# Patient Record
Sex: FEMALE | Race: WHITE | NOT HISPANIC OR LATINO | Employment: OTHER | URBAN - METROPOLITAN AREA
[De-identification: names, ages, dates, MRNs, and addresses within clinical notes are randomized per-mention and may not be internally consistent; named-entity substitution may affect disease eponyms.]

---

## 2017-01-18 ENCOUNTER — TELEPHONE (OUTPATIENT)
Dept: NEUROLOGY | Facility: CLINIC | Age: 67
End: 2017-01-18

## 2017-01-18 NOTE — TELEPHONE ENCOUNTER
----- Message from Meme Carrillo sent at 1/18/2017  4:05 PM CST -----  Contact: Brandee from Dr. Kirby Lou's office  Dr. Lou would like to refer his patient to be seen by Dr. Beebe in the Neurology department. The patient's diagnosis is parkinson's disease. I have scanned the patients referral and records into media manager. If there are any further questions regarding the patients, please contact Dr. Lou's office at  [phone].

## 2017-02-07 ENCOUNTER — TELEPHONE (OUTPATIENT)
Dept: NEUROLOGY | Facility: CLINIC | Age: 67
End: 2017-02-07

## 2017-02-07 NOTE — TELEPHONE ENCOUNTER
----- Message from Joi Adams sent at 2/7/2017  1:40 PM CST -----  Contact: pt daughter Evangelina  Pt is requesting an earlier appt due to Dr Boyd is referring her to see the doctor for her movement disorders, pt daughter would like an appt as soon as possible then 3/31/17 at 4 pm. Pt daughter would like for the nurse to call her back in regards to an sooner appt.      Pt daughter Evangelina can be reached at [phone].

## 2017-02-07 NOTE — TELEPHONE ENCOUNTER
Spoke w/ pt's daughter Evangelina and confirmed new appt day/time for 3/6/17 @ 2:40pm with Dr. Beebe. Appt letter and directions to be emailed to [email or web address]

## 2017-03-06 ENCOUNTER — OFFICE VISIT (OUTPATIENT)
Dept: NEUROLOGY | Facility: CLINIC | Age: 67
End: 2017-03-06
Payer: MEDICARE

## 2017-03-06 VITALS
SYSTOLIC BLOOD PRESSURE: 111 MMHG | HEART RATE: 81 BPM | WEIGHT: 111.13 LBS | HEIGHT: 65 IN | BODY MASS INDEX: 18.52 KG/M2 | DIASTOLIC BLOOD PRESSURE: 64 MMHG

## 2017-03-06 DIAGNOSIS — M75.02 ADHESIVE CAPSULITIS OF LEFT SHOULDER: Chronic | ICD-10-CM

## 2017-03-06 DIAGNOSIS — R26.81 UNSTEADY GAIT: ICD-10-CM

## 2017-03-06 DIAGNOSIS — G23.2 MULTIPLE SYSTEM ATROPHY P: Primary | Chronic | ICD-10-CM

## 2017-03-06 PROBLEM — N39.0 URINARY TRACT INFECTION: Status: ACTIVE | Noted: 2017-01-11

## 2017-03-06 PROBLEM — G90.3 SHY-DRAGER SYNDROME: Status: ACTIVE | Noted: 2017-03-06

## 2017-03-06 PROBLEM — F32.A DEPRESSION: Status: ACTIVE | Noted: 2017-01-18

## 2017-03-06 PROBLEM — M75.00 FROZEN SHOULDER: Chronic | Status: ACTIVE | Noted: 2017-03-06

## 2017-03-06 PROCEDURE — 99999 PR PBB SHADOW E&M-EST. PATIENT-LVL IV: CPT | Mod: PBBFAC,,, | Performed by: PSYCHIATRY & NEUROLOGY

## 2017-03-06 PROCEDURE — 99205 OFFICE O/P NEW HI 60 MIN: CPT | Mod: S$PBB,,, | Performed by: PSYCHIATRY & NEUROLOGY

## 2017-03-06 PROCEDURE — 99214 OFFICE O/P EST MOD 30 MIN: CPT | Mod: PBBFAC | Performed by: PSYCHIATRY & NEUROLOGY

## 2017-03-06 RX ORDER — CALCIUM CARBONATE 500(1250)
1 TABLET ORAL
COMMUNITY
End: 2018-08-17

## 2017-03-06 RX ORDER — NAPROXEN SODIUM 220 MG/1
81 TABLET, FILM COATED ORAL
COMMUNITY

## 2017-03-06 RX ORDER — METOPROLOL SUCCINATE 25 MG/1
25 TABLET, EXTENDED RELEASE ORAL
COMMUNITY

## 2017-03-06 RX ORDER — SERTRALINE HYDROCHLORIDE 50 MG/1
50 TABLET, FILM COATED ORAL
COMMUNITY
End: 2017-10-04

## 2017-03-06 RX ORDER — FAMOTIDINE 10 MG/1
10 TABLET ORAL DAILY
COMMUNITY

## 2017-03-06 RX ORDER — FLUDROCORTISONE ACETATE 0.1 MG/1
0.1 TABLET ORAL
COMMUNITY

## 2017-03-06 RX ORDER — CARBIDOPA AND LEVODOPA 25; 100 MG/1; MG/1
2 TABLET ORAL 3 TIMES DAILY
COMMUNITY

## 2017-03-06 RX ORDER — DOCUSATE SODIUM 100 MG/1
100 CAPSULE, LIQUID FILLED ORAL
COMMUNITY

## 2017-03-06 RX ORDER — ACETAMINOPHEN 500 MG
500 TABLET ORAL
COMMUNITY

## 2017-03-06 RX ORDER — PROCHLORPERAZINE MALEATE 10 MG
10 TABLET ORAL
COMMUNITY
End: 2017-03-06 | Stop reason: ALTCHOICE

## 2017-03-06 NOTE — MR AVS SNAPSHOT
[facility]  [address]  [place] [address]  Phone: [phone]  Fax: [phone]                  Lizzeth Mason   3/6/2017 2:40 PM   Office Visit    Description:  Female : 1950   Provider:  Ramy Beebe MD   Department:  [facility]           Diagnoses this Visit        Comments    Multiple system atrophy P    -  Primary     Unsteady gait         Adhesive capsulitis of left shoulder                To Do List           Goals (5 Years of Data)     None      [facility]     [facility] On Call Nurse Care Line -  Assistance  Registered nurses in the [facility] On Call Center provide clinical advisement, health education, appointment booking, and other advisory services.  Call for this free service at [phone].             Medications           Message regarding Medications     Verify the changes and/or additions to your medication regime listed below are the same as discussed with your clinician today.  If any of these changes or additions are incorrect, please notify your healthcare provider.        STOP taking these medications     prochlorperazine (COMPAZINE) 10 MG tablet Take 10 mg by mouth.           Verify that the below list of medications is an accurate representation of the medications you are currently taking.  If none reported, the list may be blank. If incorrect, please contact your healthcare provider. Carry this list with you in case of emergency.           Current Medications     acetaminophen (TYLENOL) 500 MG tablet Take 500 mg by mouth.    aspirin 81 MG Chew Take 81 mg by mouth.    calcium carbonate (OS-MARKIE) 500 mg calcium (1,250 mg) tablet Take 1 tablet by mouth.    carbidopa-levodopa  mg (SINEMET)  mg per tablet Take 1 tablet by mouth.    docusate sodium (COLACE) 100 MG capsule Take 100 mg by mouth.    famotidine (PEPCID) 10 MG tablet Take 10 mg by mouth.    fludrocortisone (FLORINEF) 0.1 mg Tab Take 0.1 mg by mouth.    ibuprofen  "(ADVIL,MOTRIN) 100 MG tablet Take 100 mg by mouth.    metoprolol succinate (TOPROL-XL) 25 MG 24 hr tablet Take 25 mg by mouth.    sertraline (ZOLOFT) 50 MG tablet Take 50 mg by mouth.           Clinical Reference Information           Your Vitals Were     BP Pulse Height Weight BMI    111/64 81 5' 5" (1.651 m) 50.4 kg (111 lb 1.8 oz) 18.49 kg/m2      Blood Pressure          Most Recent Value    BP  111/64      Allergies as of 3/6/2017     Chlorzoxazone    Floxin [Ofloxacin]    Morphine    Pamelor [Nortriptyline]    Compazine [Prochlorperazine Edisylate]    Phenergan [Promethazine]      Immunizations Administered on Date of Encounter - 3/6/2017     None      Orders Placed During Today's Visit      Normal Orders This Visit    Ambulatory consult to Occupational Therapy     Ambulatory consult to Orthopedics     Ambulatory consult to Physical Therapy     Ambulatory Referral to Speech Therapy       Instructions    Try taking carbidopa/levodopa 25/100mg  Three tablets three times per day if this causes significant side effects call Dr. Beebe and go back to taking it 2 tabs three times daily    Try a mattress bar at home for mobility     See an orthopedist for your left shoulder     Take your PT/OT/ and speech therapy referrals to a local therapy system. Contact Dr. Reed if you need to do home therapy again.          Language Assistance Services     ATTENTION: Language assistance services are available, free of charge. Please call [phone].      ATENCIÓN: Si habla espkristine, tiene a covington disposición servicios gratuitos de asistencia lingüística. Llame al [phone].     [facility] Ý: N?u b?n nói Ti?ng Vi?t, có các d?ch v? h? tr? ngôn ng? mi?n phí dành cho b?n. G?i s? [phone].         [facility] complies with applicable Federal civil rights laws and does not discriminate on the basis of race, color, national origin, age, disability, or sex.        "

## 2017-03-06 NOTE — LETTER
March 14, 2017      Kirby Lou MD  [address] [facility] [place] [address]           [facility]  [address]  [place] [address]  Phone: [phone]  Fax: [phone]          Patient: Lizzeth Mason   MR Number: [id number]   YOB: 1950   Date of Visit: 3/6/2017       Dear Dr. Kirby Lou:    Thank you for referring Lizzeth Mason to me for evaluation. Attached you will find relevant portions of my assessment and plan of care.    If you have questions, please do not hesitate to call me. I look forward to following Lizzeth Mason along with you.    Sincerely,    Ramy Beebe MD    Enclosure  CC:  No Recipients    If you would like to receive this communication electronically, please contact [email or web address] or [phone] to request more information on [employer] access.    For providers and/or their staff who would like to refer a patient to [facility], please contact us through our one-stop-shop provider referral line, [facility], at [phone].    If you feel you have received this communication in error or would no longer like to receive these types of communications, please e-mail [email or web address]

## 2017-03-06 NOTE — PROGRESS NOTES
Name: Lizzeth Mason  MRN: [id number]   CSN: [id number]      Date: 03/06/2017    Referring physician:  Kirby Lou MD  [address] [facility] [place] [address]    Chief Complaint / Interval History: No chief complaint on file.      History of Present Illness (HPI):  67 yo with referral for PDism. Retired ER nurse. Accompanied by her daughter and Sister     2 1/2 yeas ago, developed speech issue.  Sounded high pitched and strained.  Speech problem is progressively getting worse. First assessed in July of 2015.     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 Blood pressure issues started also in the last 2- 2 1/2 years. Pressures low as 70s over 40s. She has been on florinef for about two years.  This has been helping but only recently.     Had to have suprapubic catheter placed 1/12/17 for severe urinary retention with overflow incontinence with recuurent E. Coli UTIs    She used to suffer from significant diarrhea but now she has severe constipation.     She also has trouble chewing and swallowing     Previously independent. Now lives with her daughter     Has had multiple falls     Also suffers from Raynauds type phenomenon     Has lost function of her left side more so than the right, dropping things and draging her foot on the left     Leaning over to her left side while seated     Drools frequently, has had Botox in the past     Nonmotor/Premotor ROS:  Hyposmia (HENT)?No  RBD/sleep issues (Constitutional)?Yes, talking in her sleep, scary sounding breathing, with jeking   Depression/anxiety (Psychiatric)?More depression than anxiety   Fatigue (Constitutional)?Yes  Constipation (GI)?Yes  Urinary issues ()?Yes  Sexual dysfunction ()?N/A  Orthostasis (Cardiovascular)?Yes  Leg swelling (Cardiovascular)? Yes  Falls (Musculoskeletal)?Yes  Cognitive impairment (Neurologic)?No  Psychoses (Psychiatric)?No  Pain/Paresthesia  "(Neurologic)?Yes - feet burn and tingle   Visual changes (Eyes)?No  Moles / skin changes (Skin)?No  Stridor / SOB (Pulm)?Occasional SOB  Bruising (Heme)?No     Has also had hair loss and weight loss     Past Medical History: The patient  has no past medical history on file.    Social History: The patient  reports that she has never smoked. She does not have any smokeless tobacco history on file.    Family History: Their family history is not on file.    Allergies: Chlorzoxazone; Floxin [ofloxacin]; Morphine; Pamelor [nortriptyline]; and Ondansetron     Meds:   No current outpatient prescriptions on file prior to visit.     No current facility-administered medications on file prior to visit.        Current Neuro Meds:  Florinef 0.3mg QAM  Sinemet 25/100mg 2 tablets TID. When she is off of it, has trouble moving and has internal shaking sensation with irregular heart beat       Exam:  /64  Pulse 81  Ht 5' 5" (1.651 m)  Wt 50.4 kg (111 lb 1.8 oz)  BMI 18.49 kg/m2    Constitutional  Well-developed, well-nourished, appears stated age   Cardiovascular  Radial pulses 2+ and symmetric, + LLE edema    Neurological    * Mental status       - Orientation  Oriented to person, place, time, and situation     - Memory   Not formally tested      - Attention/concentration  Attentive, vigilant during exam     - Language  Naming & repetition intact     - Fund of knowledge  Aware of current events     - Executive  Well-organized thoughts     - Other     * Cranial nerves       - CN II  visual fields full to confrontation     - CN III, IV, VI  Extraocular movements full, normal pursuits and saccades     - CN V  Sensation V1 - V3 intact     - CN VII  Face strong and symmetric bilaterally     - CN VIII  Hearing intact bilaterally     - CN IX, X  Palate raises midline and symmetric     - CN XI  SCM and trapezius 4+/5 bilaterally     - CN XII  Tongue midline   * Motor  Muscle bulk normal, strength 4+/5 throughout Triceps, Biceps " 4/5  Can only abduct the arm to just under 90degrees   * Sensory   Intact to temperature and vibration throughout   * Coordination  No dysmetria with finger-to-nose or heel-to-shin   * Gait  See below.   * Deep tendon reflexes  3+ and symmetric throughout  + glabellar and jaw jerk  + striatal left toe      * Specialized movement exam  + hypophonic and dysphonic speech.    Slight facial masking.   No cogwheel rigidity.    Moderate to severe bradykinesia L >R.   No tremor with rest, posture, kinesis, or intention.    No other dystonia, chorea, athetosis, myoclonus, or tics.   No motor impersistence.   Slow to stand and requires both hands to do so    Stooped posture with shortend stride      Laboratory/Radiological:  - Results:No results found for any previous visit.    - Independent review of images:    MRI Brain without contrast and MRI C spine without contrast reviewed on CD in office - mild temporal atrophy with no pathognomonic signs of Parkinsonian syndromes with no severe spinal canal stenosis     WAI scan: 8/4/15   Based upon visual interpretation and quantitative analysis, abnormal  brain DaTscan.  Nearly complete lack of tracer uptake bilaterally (as  described above, type 3 deficit).  As such, images are consistent with  underlying present presynaptic Parkinsonian syndrome.  Clinical  correlation is recommended.      ______________________________  David Aldridge MD    PET Scan 11/18/2016:   FINDINGS:  GLOBAL STRUCTURE:  Mild to moderate global cerebral atrophy with a  moderate widening of the interthalamic distance as well as a mild  widening of the interhemispheric fissure.    CORTICAL METABOLISM:  Mild/subtle potential for hypometabolism within  the anterolateral portion of the temporal lobes bilaterally.   Posterior temporoparietal portions with relatively preserved uptake.   Similarly preserved uptake within the higher parietal cortex including  the posterior cingulate.  Mild/subtle hypometabolic  change in the  anterior cingulate extending subtly to the mesial/superior frontal  region.  Occipital pole metabolically intact.    SUBCORTICAL METABOLISM:  Hypometabolism within the right striatum  affecting the right putamen more so than the right caudate.  The left  striatum shows relatively preserved metabolic uptake within both  caudate and putaminal regions.  Thalamic uptake is symmetric as are  the cerebellar hemispheres and brain stem.    NEURO-Q:  Quantitative analysis demonstrates relatively mild change in  the temporal regions bilaterally.  Parietal regions do not reach  statistical significance with only subtle change in the inferior  parietal cortex and relatively preserved posterior cingulate.  Frontal  regions are similarly intact from a metabolic standpoint. Otherwise,  no statistical changes with relative preservation of the occipital  pole, visual association cortices, cerebellum and brain stem.   Right putamen is statistically hypometabolic.     IMPRESSION:  Based upon visual interpretation and quantitative analysis, relatively  normal FDG PET.  Subtle change within the bilateral temporal regions  relatively isolated to the anterolateral portions does not extend  posteriorly nor does it extend significantly up into the parietal lobe  or posterior cingulate.  Frontal structures show relatively symmetric  and overall generally preserved metabolic uptake.  All of the above  subtle changes potentially associated with normal variation.  No clear  pattern to suggest frontotemporal degeneration, nor distinct Alzheimer's   pattern.   Of note, although typically bilateral, hypometabolism of putamen regions   has   been associated with atypical Parkinsonian syndromes/Multi-system atrophy.  Clinical correlation recommended in the context of the above.    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 Lou and with Dr Beebe every 6 months to one year as desired by patient and family     Harry Bautista MD  Movement Disorders Fellow  [facility]     I spent 90 minutes face-to-face with the patient with >50% of the time spent with counseling and education regarding:  - results of data, diagnosis, and recommendations stated above  - the prognosis of a MSA   - risks and benefits of changing CD/LD dosing  - importance of diet and exercise      ==================  Patient seen and examined.  I agree with the history, exam, assessment and plan within the fellow's note as stated above.  Note has been edited by me to reflect my work and changes.    Ramy Beebe MD, MPH  Division of Movement and Memory Disorders  [facility]

## 2017-03-07 NOTE — PATIENT INSTRUCTIONS
Try taking carbidopa/levodopa 25/100mg  Three tablets three times per day if this causes significant side effects call Dr. Beebe and go back to taking it 2 tabs three times daily    Try a mattress bar at home for mobility     See an orthopedist for your left shoulder     Take your PT/OT/ and speech therapy referrals to a local therapy system. Contact Dr. Reed if you need to do home therapy again.

## 2017-05-12 ENCOUNTER — TELEPHONE (OUTPATIENT)
Dept: NEUROLOGY | Facility: CLINIC | Age: 67
End: 2017-05-12

## 2017-05-12 NOTE — TELEPHONE ENCOUNTER
----- Message from Renu Adhikari sent at 5/10/2017  4:37 PM CDT -----  Contact: Dr Parker Lou  Hi,  Dr Lou was looking for someone here who does Botox in salivary gland for a pt that has Parkinsons. Dr Chavez said she could do this but I also heard Dr Hilliard does as well. A little confused about where to send pt. Can both staff look over records that are in  and determine where to send pt. Please reply.  Thanks!  Renu

## 2017-05-17 ENCOUNTER — TELEPHONE (OUTPATIENT)
Dept: NEUROLOGY | Facility: CLINIC | Age: 67
End: 2017-05-17

## 2017-05-17 NOTE — TELEPHONE ENCOUNTER
----- Message from Renu Adhikari sent at 5/16/2017  3:50 PM CDT -----  Contact: Renu   Would Dr Chavez prefer to see pt?  ----- Message -----     From: Nallely Jaramillo, RN     Sent: 5/16/2017   3:36 PM       To: Renu Garrett agreed to see patient. However, he wasn't sure b/c patient is known to Neurology if they would prefer that route. Either is fine with Dr. Garrett. Let me know and I will schedule patient.  Thanks,  [place]  [id number]    ----- Message -----     From: Renu Adhikari     Sent: 5/16/2017   3:30 PM       To: Gerry CARBALLO Staff, Kathy HOGAN Staff    Has either Dr approved to see this pt for Botox in salivary gland ?

## 2017-05-23 NOTE — TELEPHONE ENCOUNTER
I just realized this is Ramy's patient.  He just saw her a couple months ago.  We can cut Garrett out of the loop- either DH or I can do it.

## 2017-05-24 DIAGNOSIS — K11.7 SIALORRHEA: Primary | ICD-10-CM

## 2017-06-08 ENCOUNTER — TELEPHONE (OUTPATIENT)
Dept: NEUROLOGY | Facility: CLINIC | Age: 67
End: 2017-06-08

## 2017-06-08 NOTE — TELEPHONE ENCOUNTER
Patient scheduled 6/16/17 at 8:40AM per Dr. Beebe for Botox injections. Call placed to patient to inform, left detailed message with appointment date and time as well as office call back.

## 2017-06-08 NOTE — TELEPHONE ENCOUNTER
----- Message from Kathy Villalobos sent at 6/8/2017  4:33 PM CDT -----  Contact: Pt. daughter Maegan Crowder  Good afternoon,     Pt's daughter would like a call back regarding rescheduling appt on 06/16/17 for a later time.    Pt's daughter can be reached at [phone]    Thank you!

## 2017-06-09 NOTE — TELEPHONE ENCOUNTER
Called pt daughter and left a voicemail updating her on the scheduled date and time for Lizzeth Mason on 6/16 at 8:40 a.m

## 2017-06-09 NOTE — TELEPHONE ENCOUNTER
----- Message from Carmela Bah sent at 6/9/2017  2:08 PM CDT -----  Contact: daughter Maegan Crowder called Lizzeth's phone number [phone]  Maegan states that she was told by her mom to return Kimberly's call to r/s her appt to a later time on 6/16/17. Please call

## 2017-06-16 ENCOUNTER — PROCEDURE VISIT (OUTPATIENT)
Dept: NEUROLOGY | Facility: CLINIC | Age: 67
End: 2017-06-16
Payer: MEDICARE

## 2017-06-16 VITALS
HEART RATE: 79 BPM | HEIGHT: 65 IN | DIASTOLIC BLOOD PRESSURE: 62 MMHG | BODY MASS INDEX: 19.54 KG/M2 | SYSTOLIC BLOOD PRESSURE: 101 MMHG | WEIGHT: 117.31 LBS

## 2017-06-16 DIAGNOSIS — K11.7 SIALORRHEA: Primary | ICD-10-CM

## 2017-06-16 PROCEDURE — 64611 CHEMODENERV SALIV GLANDS: CPT | Mod: S$PBB,,, | Performed by: PSYCHIATRY & NEUROLOGY

## 2017-06-16 PROCEDURE — 64611 CHEMODENERV SALIV GLANDS: CPT | Mod: PBBFAC | Performed by: PSYCHIATRY & NEUROLOGY

## 2017-06-16 RX ORDER — GLYCOPYRROLATE 2 MG/1
TABLET ORAL
COMMUNITY
Start: 2017-05-23

## 2017-06-16 RX ORDER — VENLAFAXINE HYDROCHLORIDE 75 MG/1
75 CAPSULE, EXTENDED RELEASE ORAL DAILY
COMMUNITY
Start: 2017-05-18

## 2017-06-16 RX ADMIN — ONABOTULINUMTOXINA 100 UNITS: 100 INJECTION, POWDER, LYOPHILIZED, FOR SOLUTION INTRADERMAL; INTRAMUSCULAR at 09:06

## 2017-06-20 NOTE — PROGRESS NOTES
"Clinic Documentation for BOTOX Injection   Lizzeth Mason   1950     Lizzeth Mason was seen in clinic today for Botox injections for the treatment of sialorrhea.    Botox injections are medically necessary for this patient, due to effect on the patient's quality of life, causing physical discomfort, social embarrassment, and disruption of occupational and daily activities.     PHYSICAL EXAM (FOCUSED):    Vitals:    06/16/17 0848 06/16/17 0851   BP: 115/68 101/62   Pulse: 80 79   Weight: 53.2 kg (117 lb 4.6 oz)    Height: 5' 5" (1.651 m)      See previous note for exam details.    ASSESSMENT:  Sialorrhea    PLAN/PROCEDURE:  The goal of the injections will be to reduce drooling.  The procedure was explained to patient and a consent form was signed. Using a 30 gauge injection needle and aseptic technique the following glands were identified and injected with Botox.     BOTOX INJECTION PROCEDURE:   Dilution: 1ML 0.9% NORMAL SALINE  UNITS BOTOX    B parotids  25 u each    Total injected  50  Total wasted  50    Ramy Beebe MD, MPH  Division of Movement and Memory Disorders  [facility]      "

## 2017-09-01 ENCOUNTER — TELEPHONE (OUTPATIENT)
Dept: NEUROLOGY | Facility: CLINIC | Age: 67
End: 2017-09-01

## 2017-09-01 NOTE — TELEPHONE ENCOUNTER
----- Message from Fozia Barrera sent at 9/1/2017 12:25 PM CDT -----  Contact: Pt's Daughter Maegan [phone]  Pt's daughter Maegan is requesting a call back from the nurse to reschedule appt on 9.21.17. Daughter reports they will not be able to come this week but can come the week before if possible.    Maegan may be reached at [phone].    Thank you.  LC

## 2017-10-04 ENCOUNTER — PROCEDURE VISIT (OUTPATIENT)
Dept: NEUROLOGY | Facility: CLINIC | Age: 67
End: 2017-10-04
Payer: MEDICARE

## 2017-10-04 VITALS
SYSTOLIC BLOOD PRESSURE: 89 MMHG | DIASTOLIC BLOOD PRESSURE: 57 MMHG | WEIGHT: 117.31 LBS | HEART RATE: 77 BPM | HEIGHT: 65 IN | BODY MASS INDEX: 19.54 KG/M2

## 2017-10-04 DIAGNOSIS — K11.7 SIALORRHEA: Primary | ICD-10-CM

## 2017-10-04 DIAGNOSIS — G23.2 MULTIPLE SYSTEM ATROPHY P: ICD-10-CM

## 2017-10-04 PROCEDURE — 64611 CHEMODENERV SALIV GLANDS: CPT | Mod: S$PBB,,, | Performed by: PSYCHIATRY & NEUROLOGY

## 2017-10-04 PROCEDURE — 64611 CHEMODENERV SALIV GLANDS: CPT | Mod: PBBFAC | Performed by: PSYCHIATRY & NEUROLOGY

## 2017-10-04 PROCEDURE — 99499 UNLISTED E&M SERVICE: CPT | Mod: S$PBB,,, | Performed by: PSYCHIATRY & NEUROLOGY

## 2017-10-04 RX ORDER — CLOPIDOGREL BISULFATE 75 MG/1
TABLET ORAL
COMMUNITY
Start: 2017-09-15 | End: 2018-08-17

## 2017-10-04 RX ADMIN — ONABOTULINUMTOXINA 100 UNITS: 100 INJECTION, POWDER, LYOPHILIZED, FOR SOLUTION INTRADERMAL; INTRAMUSCULAR at 11:10

## 2017-10-10 RX ORDER — METHYLPHENIDATE HYDROCHLORIDE 5 MG/1
5 TABLET ORAL DAILY
Qty: 30 TABLET | Refills: 0 | Status: SHIPPED | OUTPATIENT
Start: 2017-10-10 | End: 2017-11-14 | Stop reason: SDUPTHER

## 2017-10-20 ENCOUNTER — TELEPHONE (OUTPATIENT)
Dept: NEUROLOGY | Facility: CLINIC | Age: 67
End: 2017-10-20

## 2017-10-20 NOTE — TELEPHONE ENCOUNTER
Pt daughter stated that the provider mentioned a cream that can help with her mother pain. She would like to know when could it be prescribed.

## 2017-10-20 NOTE — TELEPHONE ENCOUNTER
----- Message from Lenka Isaacs sent at 10/20/2017 10:19 AM CDT -----  Contact: Maegan (Daughter) [phone]  Maegan would like to speak to someone regarding pain creme for her foot and hands. She would like the prescription called in. Please contact Maegan at [phone] with any questions or concerns.    [facility] [place] [phone]   [place] [address]  Phone: [phone] Fax: [phone]

## 2017-11-03 ENCOUNTER — TELEPHONE (OUTPATIENT)
Dept: NEUROLOGY | Facility: CLINIC | Age: 67
End: 2017-11-03

## 2017-11-03 NOTE — TELEPHONE ENCOUNTER
Called patient daughter and she stated that provider was planning on prescribing patient pain cream. I informed patient that Dr. Beebe is out of the clinic till monday and that the form for the cream will be filled out and ready for provider signature once he makes it back in clinic.

## 2017-11-03 NOTE — TELEPHONE ENCOUNTER
----- Message from Dex Amato sent at 11/3/2017 12:07 PM CDT -----  Contact: Elba ( daughter ) [phone]  Caller is requesting a return phone call regarding medication ( pain cream ), pls call

## 2017-11-06 ENCOUNTER — TELEPHONE (OUTPATIENT)
Dept: NEUROLOGY | Facility: CLINIC | Age: 67
End: 2017-11-06

## 2017-11-06 NOTE — TELEPHONE ENCOUNTER
----- Message from Dex Amato sent at 11/6/2017 11:14 AM CST -----  Contact: Elba ( daughter ) [phone]  Caller is calling to get an update on the pain cream, pls call

## 2017-11-14 NOTE — TELEPHONE ENCOUNTER
----- Message from Dex Amato sent at 11/13/2017  3:01 PM CST -----  Contact: Maegan ( daughter ) [phone]  Caller is calling to get an update on the medication refill ( methylphenidate HCl (RITALIN) 5 MG tablet ) and on the pain cream for hand and foot, pls call     [facility] [place] [phone]   [place] [address]  Phone: [phone] Fax: [phone]

## 2017-11-14 NOTE — TELEPHONE ENCOUNTER
----- Message from Darling Ha sent at 11/13/2017 10:01 AM CST -----  Contact: Pt's daughter Maegan Crowder  Pt's daughter, Maegan,  would like to be called back regarding refills on pt's medication.        Please call Maegan at [phone].        Thanks!

## 2017-11-15 RX ORDER — METHYLPHENIDATE HYDROCHLORIDE 5 MG/1
5 TABLET ORAL DAILY
Qty: 30 TABLET | Refills: 0 | Status: SHIPPED | OUTPATIENT
Start: 2017-11-15 | End: 2017-12-11 | Stop reason: SDUPTHER

## 2017-12-11 NOTE — TELEPHONE ENCOUNTER
----- Message from Sonido Basilio sent at 12/11/2017 10:13 AM CST -----  Contact: Pt Daughter-Maura   Calling to get a refill for methylphenidate HCl (RITALIN) 5 MG tablet. Pt daughter would like to know if the pt can get (2) refills for this medication, so they don't have to call once a month if possible.    Pt daughter would like the refill sent to the pharmacy on file.         Call back number: [phone]

## 2017-12-13 RX ORDER — METHYLPHENIDATE HYDROCHLORIDE 5 MG/1
5 TABLET ORAL DAILY
Qty: 30 TABLET | Refills: 0 | Status: SHIPPED | OUTPATIENT
Start: 2017-12-13 | End: 2018-02-06 | Stop reason: SDUPTHER

## 2017-12-20 DIAGNOSIS — K11.7 SIALORRHEA: Primary | ICD-10-CM

## 2018-02-05 NOTE — TELEPHONE ENCOUNTER
----- Message from Maria Victoria Patterson sent at 2/5/2018  4:22 PM CST -----  Contact: Maegan (daughter) @ [phone]  Calling to reschedule pts Botox appt.  Pls call.

## 2018-02-05 NOTE — TELEPHONE ENCOUNTER
----- Message from Maria Victoria Patterson sent at 2/5/2018  4:20 PM CST -----  Contact: Maegan (daughter) @ [phone]  Pt is req a refill for methylphenidate HCl (RITALIN) 5 MG tablet.  Pt only has 1 day of medication left.      [facility] [place] [address] [facility] [address] [phone] (Phone)  [phone] (Fax)

## 2018-02-06 RX ORDER — METHYLPHENIDATE HYDROCHLORIDE 5 MG/1
TABLET ORAL
Qty: 30 TABLET | Refills: 0 | Status: SHIPPED | OUTPATIENT
Start: 2018-02-06 | End: 2018-06-06 | Stop reason: SDUPTHER

## 2018-02-07 RX ORDER — METHYLPHENIDATE HYDROCHLORIDE 5 MG/1
5 TABLET ORAL DAILY
Qty: 30 TABLET | Refills: 0 | Status: SHIPPED | OUTPATIENT
Start: 2018-02-07 | End: 2018-04-09 | Stop reason: SDUPTHER

## 2018-02-07 NOTE — TELEPHONE ENCOUNTER
----- Message from Dex Amato sent at 2/7/2018 10:29 AM CST -----  Contact: Maegan ( daughter ) @ [phone]  Caller is calling to schedule the 1-11 appt, mikaela call ASAP

## 2018-02-07 NOTE — TELEPHONE ENCOUNTER
----- Message from Dex Amato sent at 2/7/2018 10:29 AM CST -----  Contact: Maegan ( daughter ) @ [phone]  Caller is calling to schedule the 1-11 appt, mikaela call ASAP

## 2018-02-08 ENCOUNTER — PROCEDURE VISIT (OUTPATIENT)
Dept: NEUROLOGY | Facility: CLINIC | Age: 68
End: 2018-02-08
Payer: MEDICARE

## 2018-02-08 VITALS
SYSTOLIC BLOOD PRESSURE: 130 MMHG | WEIGHT: 111.56 LBS | BODY MASS INDEX: 18.59 KG/M2 | HEIGHT: 65 IN | HEART RATE: 90 BPM | DIASTOLIC BLOOD PRESSURE: 82 MMHG

## 2018-02-08 DIAGNOSIS — G24.8 FOCAL DYSTONIA: ICD-10-CM

## 2018-02-08 DIAGNOSIS — K11.7 SIALORRHEA: Primary | ICD-10-CM

## 2018-02-08 DIAGNOSIS — G24.8 LIMB DYSTONIA: ICD-10-CM

## 2018-02-08 PROCEDURE — 64611 CHEMODENERV SALIV GLANDS: CPT | Mod: S$PBB,51,, | Performed by: PSYCHIATRY & NEUROLOGY

## 2018-02-08 PROCEDURE — 64611 CHEMODENERV SALIV GLANDS: CPT | Mod: PBBFAC | Performed by: PSYCHIATRY & NEUROLOGY

## 2018-02-08 PROCEDURE — 64642 CHEMODENERV 1 EXTREMITY 1-4: CPT | Mod: S$PBB,,, | Performed by: PSYCHIATRY & NEUROLOGY

## 2018-02-08 PROCEDURE — 95874 GUIDE NERV DESTR NEEDLE EMG: CPT | Mod: 26,S$PBB,, | Performed by: PSYCHIATRY & NEUROLOGY

## 2018-02-08 PROCEDURE — 99499 UNLISTED E&M SERVICE: CPT | Mod: S$PBB,,, | Performed by: PSYCHIATRY & NEUROLOGY

## 2018-02-08 PROCEDURE — 64642 CHEMODENERV 1 EXTREMITY 1-4: CPT | Mod: PBBFAC | Performed by: PSYCHIATRY & NEUROLOGY

## 2018-02-08 PROCEDURE — 95874 GUIDE NERV DESTR NEEDLE EMG: CPT | Mod: PBBFAC | Performed by: PSYCHIATRY & NEUROLOGY

## 2018-02-08 RX ORDER — OXYCODONE AND ACETAMINOPHEN 10; 325 MG/1; MG/1
1 TABLET ORAL
COMMUNITY
Start: 2018-01-19 | End: 2018-02-18

## 2018-02-08 RX ADMIN — ONABOTULINUMTOXINA 100 UNITS: 100 INJECTION, POWDER, LYOPHILIZED, FOR SOLUTION INTRADERMAL; INTRAMUSCULAR at 09:02

## 2018-02-08 NOTE — PROGRESS NOTES
"Clinic Documentation for BOTOX Injection   Lizzeth Mason   1950     Lizzeth Mason was seen in clinic today for Botox injections for the treatment of sialorrhea.  Also with further neck and leg spasm.    Botox injections are medically necessary for this patient, due to effect on the patient's quality of life, causing physical discomfort, social embarrassment, and disruption of occupational and daily activities.     PHYSICAL EXAM (FOCUSED):    Vitals:    02/08/18 1419   BP: 130/82   Pulse: 90   Weight: 50.6 kg (111 lb 8.8 oz)   Height: 5' 5" (1.651 m)       ASSESSMENT:  Sialorrhea    PLAN/PROCEDURE:  The goal of the injections will be to reduce drooling.  The procedure was explained to patient and a consent form was signed. Using a 30 gauge injection needle and aseptic technique the following glands were identified and injected with Botox.     BOTOX INJECTION PROCEDURE:   Dilution: 1ML 0.9% NORMAL SALINE  UNITS BOTOX    B parotids   40 u each  B semispinalis  off today  L EDL (striatal toe) 20  L FDL (arm)  50  L FDP (arm)  50    Total injected  200  Total wasted  0    Ramy Beebe MD, MPH  Division of Movement and Memory Disorders  [facility]      "

## 2018-04-09 NOTE — TELEPHONE ENCOUNTER
----- Message from Maria Victoria Patterson sent at 4/9/2018  4:19 PM CDT -----  Contact: Maegan (daughter) @ [phone]  Pt is req a refill for methylphenidate HCl (RITALIN) 5 MG tablet.      [facility] - [place] [phone]   [place] [address]  Phone: [phone] Fax: [phone]

## 2018-04-10 RX ORDER — METHYLPHENIDATE HYDROCHLORIDE 5 MG/1
5 TABLET ORAL DAILY
Qty: 30 TABLET | Refills: 0 | Status: SHIPPED | OUTPATIENT
Start: 2018-04-10 | End: 2018-06-06 | Stop reason: SDUPTHER

## 2018-04-26 DIAGNOSIS — K11.7 SIALORRHEA: Primary | ICD-10-CM

## 2018-04-26 DIAGNOSIS — G24.8 FOCAL DYSTONIA: ICD-10-CM

## 2018-05-08 RX ADMIN — ONABOTULINUMTOXINA 200 UNITS: 100 INJECTION, POWDER, LYOPHILIZED, FOR SOLUTION INTRADERMAL; INTRAMUSCULAR at 10:05

## 2018-05-09 ENCOUNTER — PROCEDURE VISIT (OUTPATIENT)
Dept: NEUROLOGY | Facility: CLINIC | Age: 68
End: 2018-05-09
Payer: MEDICARE

## 2018-05-09 VITALS — DIASTOLIC BLOOD PRESSURE: 70 MMHG | HEART RATE: 82 BPM | SYSTOLIC BLOOD PRESSURE: 112 MMHG

## 2018-05-09 DIAGNOSIS — G24.3 CERVICAL DYSTONIA: Primary | ICD-10-CM

## 2018-05-09 DIAGNOSIS — K11.7 SIALORRHEA: ICD-10-CM

## 2018-05-09 PROCEDURE — 64616 CHEMODENERV MUSC NECK DYSTON: CPT | Mod: 50,PBBFAC | Performed by: PSYCHIATRY & NEUROLOGY

## 2018-05-09 PROCEDURE — 64616 CHEMODENERV MUSC NECK DYSTON: CPT | Mod: 50,S$PBB,, | Performed by: PSYCHIATRY & NEUROLOGY

## 2018-05-09 PROCEDURE — 64642 CHEMODENERV 1 EXTREMITY 1-4: CPT | Mod: S$PBB,,, | Performed by: PSYCHIATRY & NEUROLOGY

## 2018-05-09 PROCEDURE — 64611 CHEMODENERV SALIV GLANDS: CPT | Mod: S$PBB,51,, | Performed by: PSYCHIATRY & NEUROLOGY

## 2018-05-09 PROCEDURE — 64611 CHEMODENERV SALIV GLANDS: CPT | Mod: PBBFAC | Performed by: PSYCHIATRY & NEUROLOGY

## 2018-05-09 PROCEDURE — 64642 CHEMODENERV 1 EXTREMITY 1-4: CPT | Mod: PBBFAC | Performed by: PSYCHIATRY & NEUROLOGY

## 2018-05-09 RX ORDER — RIVAROXABAN 20 MG/1
20 TABLET, FILM COATED ORAL DAILY
COMMUNITY
Start: 2018-04-16

## 2018-05-09 RX ORDER — METHYLPHENIDATE HYDROCHLORIDE 5 MG/1
TABLET ORAL
Qty: 30 TABLET | Refills: 0 | Status: SHIPPED | OUTPATIENT
Start: 2018-05-09 | End: 2018-06-06 | Stop reason: SDUPTHER

## 2018-05-16 NOTE — PROGRESS NOTES
Clinic Documentation for BOTOX Injection   Lizzeth Mason   1950     Lizzeth Mason was seen in clinic today for Botox injections for the treatment of sialorrhea.  Also with further neck and leg spasm.    Botox injections are medically necessary for this patient, due to effect on the patient's quality of life, causing physical discomfort, social embarrassment, and disruption of occupational and daily activities.     Tolerated well last time, will repeat.    PHYSICAL EXAM (FOCUSED):    Vitals:    05/09/18 1317   BP: 112/70   Pulse: 82       ASSESSMENT:  Sialorrhea    PLAN/PROCEDURE:  The goal of the injections will be to reduce drooling.  The procedure was explained to patient and a consent form was signed. Using a 30 gauge injection needle and aseptic technique the following glands were identified and injected with Botox.     BOTOX INJECTION PROCEDURE:   Dilution: 1ML 0.9% NORMAL SALINE  UNITS BOTOX    B parotids   40 u each  B semispinalis  off today  L EDL (striatal toe) 20  L FDL (arm)  50  L FDP (arm)  50    Total injected  200  Total wasted  0    Ramy Beebe MD, MPH  Division of Movement and Memory Disorders  [facility]

## 2018-06-06 DIAGNOSIS — G23.2 MULTIPLE SYSTEM ATROPHY P: Primary | Chronic | ICD-10-CM

## 2018-06-06 RX ORDER — METHYLPHENIDATE HYDROCHLORIDE 5 MG/1
TABLET ORAL
Qty: 30 TABLET | Refills: 0 | Status: SHIPPED | OUTPATIENT
Start: 2018-06-06 | End: 2021-06-15

## 2018-06-07 RX ORDER — METHYLPHENIDATE HYDROCHLORIDE 5 MG/1
TABLET ORAL
Qty: 30 TABLET | Refills: 0 | Status: SHIPPED | OUTPATIENT
Start: 2018-06-07 | End: 2018-08-03 | Stop reason: SDUPTHER

## 2018-08-03 RX ORDER — METHYLPHENIDATE HYDROCHLORIDE 5 MG/1
TABLET ORAL
Qty: 30 TABLET | Refills: 0 | Status: SHIPPED | OUTPATIENT
Start: 2018-08-03 | End: 2018-08-17 | Stop reason: SDUPTHER

## 2018-08-08 DIAGNOSIS — K11.7 SIALORRHEA: Primary | ICD-10-CM

## 2018-08-08 DIAGNOSIS — G24.8 FOCAL DYSTONIA: ICD-10-CM

## 2018-08-17 ENCOUNTER — PROCEDURE VISIT (OUTPATIENT)
Dept: NEUROLOGY | Facility: CLINIC | Age: 68
End: 2018-08-17
Payer: MEDICARE

## 2018-08-17 VITALS
DIASTOLIC BLOOD PRESSURE: 71 MMHG | WEIGHT: 110 LBS | HEIGHT: 65 IN | HEART RATE: 78 BPM | BODY MASS INDEX: 18.33 KG/M2 | SYSTOLIC BLOOD PRESSURE: 126 MMHG

## 2018-08-17 DIAGNOSIS — G90.3 MULTIPLE SYSTEM ATROPHY: Primary | ICD-10-CM

## 2018-08-17 DIAGNOSIS — G24.8 LIMB DYSTONIA: ICD-10-CM

## 2018-08-17 DIAGNOSIS — G23.8 MULTIPLE SYSTEM ATROPHY: Primary | ICD-10-CM

## 2018-08-17 PROCEDURE — 64643 CHEMODENERV 1 EXTREM 1-4 EA: CPT | Mod: S$PBB,,, | Performed by: PSYCHIATRY & NEUROLOGY

## 2018-08-17 PROCEDURE — 64616 CHEMODENERV MUSC NECK DYSTON: CPT | Mod: PBBFAC | Performed by: PSYCHIATRY & NEUROLOGY

## 2018-08-17 PROCEDURE — 64642 CHEMODENERV 1 EXTREMITY 1-4: CPT | Mod: S$PBB,,, | Performed by: PSYCHIATRY & NEUROLOGY

## 2018-08-17 PROCEDURE — 99499 UNLISTED E&M SERVICE: CPT | Mod: S$PBB,,, | Performed by: PSYCHIATRY & NEUROLOGY

## 2018-08-17 PROCEDURE — 64611 CHEMODENERV SALIV GLANDS: CPT | Mod: PBBFAC | Performed by: PSYCHIATRY & NEUROLOGY

## 2018-08-17 PROCEDURE — 64611 CHEMODENERV SALIV GLANDS: CPT | Mod: S$PBB,51,, | Performed by: PSYCHIATRY & NEUROLOGY

## 2018-08-17 PROCEDURE — 64642 CHEMODENERV 1 EXTREMITY 1-4: CPT | Mod: PBBFAC | Performed by: PSYCHIATRY & NEUROLOGY

## 2018-08-17 PROCEDURE — 64616 CHEMODENERV MUSC NECK DYSTON: CPT | Mod: S$PBB,50,, | Performed by: PSYCHIATRY & NEUROLOGY

## 2018-08-17 PROCEDURE — 64643 CHEMODENERV 1 EXTREM 1-4 EA: CPT | Mod: PBBFAC | Performed by: PSYCHIATRY & NEUROLOGY

## 2018-08-21 RX ADMIN — ONABOTULINUMTOXINA 200 UNITS: 100 INJECTION, POWDER, LYOPHILIZED, FOR SOLUTION INTRADERMAL; INTRAMUSCULAR at 10:08

## 2018-08-22 NOTE — PROGRESS NOTES
"Clinic Documentation for BOTOX Injection   Lizzeth Mason   1950     Lizzeth Mason was seen in clinic today for Botox injections for the treatment of sialorrhea.  Also with further neck and leg spasm.    Botox injections are medically necessary for this patient, due to effect on the patient's quality of life, causing physical discomfort, social embarrassment, and disruption of occupational and daily activities.     Tolerated well last time, will repeat.    PHYSICAL EXAM (FOCUSED):    Vitals:    08/17/18 1356   BP: 126/71   BP Location: Left arm   Patient Position: Sitting   BP Method: Medium (Automatic)   Pulse: 78   Weight: 49.9 kg (110 lb)   Height: 5' 5" (1.651 m)       ASSESSMENT:  Sialorrhea    PLAN/PROCEDURE:  The goal of the injections will be to reduce drooling.  The procedure was explained to patient and a consent form was signed. Using a 30 gauge injection needle and aseptic technique the following glands were identified and injected with Botox.     BOTOX INJECTION PROCEDURE:   Dilution: 1ML 0.9% NORMAL SALINE  UNITS BOTOX    B parotids   50 u each  B semispinalis  off today  L EDL (striatal toe) 50  L FDL (arm)  100  L FDP (arm)  50    Total injected  300  Total wasted  0    Ramy Beebe MD, MPH  Division of Movement and Memory Disorders  [facility]      "

## 2018-09-04 RX ORDER — METHYLPHENIDATE HYDROCHLORIDE 5 MG/1
TABLET ORAL
Qty: 90 TABLET | Refills: 0 | Status: SHIPPED | OUTPATIENT
Start: 2018-09-04 | End: 2018-11-26 | Stop reason: SDUPTHER

## 2018-10-16 DIAGNOSIS — G24.8 FOCAL DYSTONIA: ICD-10-CM

## 2018-10-16 DIAGNOSIS — K11.7 SIALORRHEA: Primary | ICD-10-CM

## 2018-10-26 ENCOUNTER — PROCEDURE VISIT (OUTPATIENT)
Dept: NEUROLOGY | Facility: CLINIC | Age: 68
End: 2018-10-26
Payer: MEDICARE

## 2018-10-26 VITALS
HEART RATE: 85 BPM | SYSTOLIC BLOOD PRESSURE: 115 MMHG | DIASTOLIC BLOOD PRESSURE: 76 MMHG | HEIGHT: 65 IN | BODY MASS INDEX: 18.3 KG/M2

## 2018-10-26 DIAGNOSIS — G23.8 MULTIPLE SYSTEM ATROPHY: Primary | ICD-10-CM

## 2018-10-26 DIAGNOSIS — G90.3 MULTIPLE SYSTEM ATROPHY: Primary | ICD-10-CM

## 2018-10-26 PROCEDURE — 64642 CHEMODENERV 1 EXTREMITY 1-4: CPT | Mod: PBBFAC | Performed by: PSYCHIATRY & NEUROLOGY

## 2018-10-26 PROCEDURE — 64611 CHEMODENERV SALIV GLANDS: CPT | Mod: S$PBB,51,, | Performed by: PSYCHIATRY & NEUROLOGY

## 2018-10-26 PROCEDURE — 64611 CHEMODENERV SALIV GLANDS: CPT | Mod: PBBFAC | Performed by: PSYCHIATRY & NEUROLOGY

## 2018-10-26 PROCEDURE — 99499 UNLISTED E&M SERVICE: CPT | Mod: S$PBB,,, | Performed by: PSYCHIATRY & NEUROLOGY

## 2018-10-26 PROCEDURE — 64642 CHEMODENERV 1 EXTREMITY 1-4: CPT | Mod: S$PBB,,, | Performed by: PSYCHIATRY & NEUROLOGY

## 2018-10-26 RX ORDER — AMOXICILLIN AND CLAVULANATE POTASSIUM 500; 125 MG/1; MG/1
TABLET, FILM COATED ORAL
COMMUNITY

## 2018-10-26 NOTE — PROGRESS NOTES
"Clinic Documentation for BOTOX Injection   Lizzeth Mason   1950     Lizzeth Mason was seen in clinic today for Botox injections for the treatment of sialorrhea.  Also with further neck and leg spasm.    Botox injections are medically necessary for this patient, due to effect on the patient's quality of life, causing physical discomfort, social embarrassment, and disruption of occupational and daily activities.     Tolerated well last time, will repeat.    PHYSICAL EXAM (FOCUSED):    Vitals:    10/26/18 1357   BP: 115/76   Pulse: 85   Height: 5' 5" (1.651 m)       ASSESSMENT:  Sialorrhea and     PLAN/PROCEDURE:  The goal of the injections will be to reduce drooling.  The procedure was explained to patient and a consent form was signed. Using a 30 gauge injection needle and aseptic technique the following glands were identified and injected with Botox.     BOTOX INJECTION PROCEDURE:   Dilution: 1ML 0.9% NORMAL SALINE  UNITS BOTOX    B parotids   25 u each  B submandibular 25 u each  B semispinalis  off today  L EDL (striatal toe) Off today  L FDS (arm)  100  L FDP (arm)  100    Total injected  250  Total wasted  50    Ramy Beebe MD, MPH  Division of Movement and Memory Disorders  [facility]      "

## 2018-10-30 RX ADMIN — ONABOTULINUMTOXINA 300 UNITS: 100 INJECTION, POWDER, LYOPHILIZED, FOR SOLUTION INTRADERMAL; INTRAMUSCULAR at 11:10

## 2018-11-26 RX ORDER — METHYLPHENIDATE HYDROCHLORIDE 5 MG/1
TABLET ORAL
Qty: 90 TABLET | Refills: 0 | Status: SHIPPED | OUTPATIENT
Start: 2018-11-26 | End: 2019-02-25 | Stop reason: SDUPTHER

## 2018-11-26 NOTE — TELEPHONE ENCOUNTER
----- Message from Jazlyn Chavez sent at 11/26/2018  3:16 PM CST -----  Rx Refill/Request     Is this a Refill or New Rx:  Refill (says the doctor says he would refill for 3 months)  Rx Name and Strength:  methylphenidate HCl (RITALIN) 5 MG tablet  Preferred Pharmacy with phone number:   [facility] - [place] - [address] [facility] [address]  [place] [address]  Phone: [phone] Fax: [phone]    Communication Preference: Maegan(dtr) @ [phone]  Additional Information: call patient when done.

## 2019-01-09 DIAGNOSIS — K11.7 SIALORRHEA: Primary | ICD-10-CM

## 2019-01-09 DIAGNOSIS — G24.8 FOCAL DYSTONIA: ICD-10-CM

## 2019-01-23 ENCOUNTER — PROCEDURE VISIT (OUTPATIENT)
Dept: NEUROLOGY | Facility: CLINIC | Age: 69
End: 2019-01-23
Payer: MEDICARE

## 2019-01-23 VITALS
HEIGHT: 65 IN | HEART RATE: 88 BPM | DIASTOLIC BLOOD PRESSURE: 78 MMHG | SYSTOLIC BLOOD PRESSURE: 121 MMHG | WEIGHT: 110 LBS | BODY MASS INDEX: 18.33 KG/M2

## 2019-01-23 DIAGNOSIS — K11.7 SIALORRHEA: Primary | ICD-10-CM

## 2019-01-23 PROCEDURE — 64611 CHEMODENERV SALIV GLANDS: CPT | Mod: S$PBB,51,, | Performed by: PSYCHIATRY & NEUROLOGY

## 2019-01-23 PROCEDURE — 99499 NO LOS: ICD-10-PCS | Mod: S$PBB,,, | Performed by: PSYCHIATRY & NEUROLOGY

## 2019-01-23 PROCEDURE — 64642 CHEMODENERV 1 EXTREMITY 1-4: CPT | Mod: PBBFAC | Performed by: PSYCHIATRY & NEUROLOGY

## 2019-01-23 PROCEDURE — 64642 CHEMODENERV 1 EXTREMITY 1-4: CPT | Mod: S$PBB,,, | Performed by: PSYCHIATRY & NEUROLOGY

## 2019-01-23 PROCEDURE — 64611 PR CHEMODENERVATION PAROTID/SUBMANDIBULAR SALIVARY GLANDS,BILATERAL: ICD-10-PCS | Mod: S$PBB,51,, | Performed by: PSYCHIATRY & NEUROLOGY

## 2019-01-23 PROCEDURE — 64611 CHEMODENERV SALIV GLANDS: CPT | Mod: PBBFAC | Performed by: PSYCHIATRY & NEUROLOGY

## 2019-01-23 PROCEDURE — 99499 UNLISTED E&M SERVICE: CPT | Mod: S$PBB,,, | Performed by: PSYCHIATRY & NEUROLOGY

## 2019-01-23 PROCEDURE — 64642 PR CHEMODENERV ONE EXTREMITY; 1-4 MUSCLE(S): ICD-10-PCS | Mod: S$PBB,,, | Performed by: PSYCHIATRY & NEUROLOGY

## 2019-01-23 RX ADMIN — ONABOTULINUMTOXINA 100 UNITS: 100 INJECTION, POWDER, LYOPHILIZED, FOR SOLUTION INTRADERMAL; INTRAMUSCULAR at 12:01

## 2019-01-30 NOTE — PROGRESS NOTES
"Clinic Documentation for BOTOX Injection   Lizzeth Mason   1950     Lizzeth Mason was seen in clinic today for Botox injections for the treatment of sialorrhea.  Also with further neck and leg spasm.    Botox injections are medically necessary for this patient, due to effect on the patient's quality of life, causing physical discomfort, social embarrassment, and disruption of occupational and daily activities.     Tolerated well last time, will repeat.    PHYSICAL EXAM (FOCUSED):    Vitals:    01/23/19 1356   BP: 121/78   Pulse: 88   Weight: 49.9 kg (110 lb)   Height: 5' 5" (1.651 m)       ASSESSMENT:  Sialorrhea and     PLAN/PROCEDURE:  The goal of the injections will be to reduce drooling.  The procedure was explained to patient and a consent form was signed. Using a 30 gauge injection needle and aseptic technique the following glands were identified and injected with Botox.     BOTOX INJECTION PROCEDURE:   Dilution: 1ML 0.9% NORMAL SALINE  UNITS BOTOX    B parotids   25 u each  B submandibular 25 u each  B semispinalis  off today  L EDL (striatal toe) Off today  L FDS (arm)  100  L FDP (arm)  100    Total injected  250  Total wasted  50    Ramy Beebe MD, MPH  Division of Movement and Memory Disorders  [facility]      "

## 2019-02-26 RX ORDER — METHYLPHENIDATE HYDROCHLORIDE 5 MG/1
TABLET ORAL
Qty: 90 TABLET | Refills: 0 | Status: SHIPPED | OUTPATIENT
Start: 2019-02-26 | End: 2019-06-03 | Stop reason: SDUPTHER

## 2019-04-08 DIAGNOSIS — K11.7 SIALORRHEA: Primary | ICD-10-CM

## 2019-04-08 DIAGNOSIS — G24.8 FOCAL DYSTONIA: ICD-10-CM

## 2019-04-17 ENCOUNTER — PROCEDURE VISIT (OUTPATIENT)
Dept: NEUROLOGY | Facility: CLINIC | Age: 69
End: 2019-04-17
Payer: MEDICARE

## 2019-04-17 VITALS
BODY MASS INDEX: 18.33 KG/M2 | HEART RATE: 84 BPM | SYSTOLIC BLOOD PRESSURE: 127 MMHG | HEIGHT: 65 IN | DIASTOLIC BLOOD PRESSURE: 76 MMHG | WEIGHT: 110 LBS

## 2019-04-17 DIAGNOSIS — G24.8 LIMB DYSTONIA: ICD-10-CM

## 2019-04-17 DIAGNOSIS — G24.3 CERVICAL DYSTONIA: Primary | ICD-10-CM

## 2019-04-17 DIAGNOSIS — G23.8 MULTIPLE SYSTEM ATROPHY: ICD-10-CM

## 2019-04-17 DIAGNOSIS — G90.3 MULTIPLE SYSTEM ATROPHY: ICD-10-CM

## 2019-04-17 PROCEDURE — 64642 CHEMODENERV 1 EXTREMITY 1-4: CPT | Mod: S$PBB,,, | Performed by: PSYCHIATRY & NEUROLOGY

## 2019-04-17 PROCEDURE — 64616 CHEMODENERV MUSC NECK DYSTON: CPT | Mod: 50,PBBFAC | Performed by: PSYCHIATRY & NEUROLOGY

## 2019-04-17 PROCEDURE — 64643 PR CHEMODENERV 1 EXT; EA ADD'L EXT, 1-4 MUSCLE(S): ICD-10-PCS | Mod: S$PBB,,, | Performed by: PSYCHIATRY & NEUROLOGY

## 2019-04-17 PROCEDURE — 64616 PR CHEMODENERVATION NECK MUSCLES EXC LARNYNX, UNI: ICD-10-PCS | Mod: 50,S$PBB,, | Performed by: PSYCHIATRY & NEUROLOGY

## 2019-04-17 PROCEDURE — 64616 CHEMODENERV MUSC NECK DYSTON: CPT | Mod: 50,S$PBB,, | Performed by: PSYCHIATRY & NEUROLOGY

## 2019-04-17 PROCEDURE — 99499 NO LOS: ICD-10-PCS | Mod: S$PBB,,, | Performed by: PSYCHIATRY & NEUROLOGY

## 2019-04-17 PROCEDURE — 64611 CHEMODENERV SALIV GLANDS: CPT | Mod: S$PBB,51,, | Performed by: PSYCHIATRY & NEUROLOGY

## 2019-04-17 PROCEDURE — 64642 CHEMODENERV 1 EXTREMITY 1-4: CPT | Mod: PBBFAC | Performed by: PSYCHIATRY & NEUROLOGY

## 2019-04-17 PROCEDURE — 64611 CHEMODENERV SALIV GLANDS: CPT | Mod: 50,PBBFAC | Performed by: PSYCHIATRY & NEUROLOGY

## 2019-04-17 PROCEDURE — 99499 UNLISTED E&M SERVICE: CPT | Mod: S$PBB,,, | Performed by: PSYCHIATRY & NEUROLOGY

## 2019-04-17 PROCEDURE — 64642 PR CHEMODENERV ONE EXTREMITY; 1-4 MUSCLE(S): ICD-10-PCS | Mod: S$PBB,,, | Performed by: PSYCHIATRY & NEUROLOGY

## 2019-04-17 PROCEDURE — 64643 CHEMODENERV 1 EXTREM 1-4 EA: CPT | Mod: S$PBB,,, | Performed by: PSYCHIATRY & NEUROLOGY

## 2019-04-17 PROCEDURE — 64611 PR CHEMODENERVATION PAROTID/SUBMANDIBULAR SALIVARY GLANDS,BILATERAL: ICD-10-PCS | Mod: S$PBB,51,, | Performed by: PSYCHIATRY & NEUROLOGY

## 2019-04-17 RX ORDER — NITROFURANTOIN (MACROCRYSTALS) 100 MG/1
CAPSULE ORAL
COMMUNITY

## 2019-04-23 RX ADMIN — ONABOTULINUMTOXINA 300 UNITS: 100 INJECTION, POWDER, LYOPHILIZED, FOR SOLUTION INTRADERMAL; INTRAMUSCULAR at 11:04

## 2019-04-24 NOTE — PROGRESS NOTES
"Clinic Documentation for BOTOX Injection   Lizzeth Mason   1950     Lizzeth Mason was seen in clinic today for Botox injections for the treatment of sialorrhea.  Also with further neck and leg spasm.    Botox injections are medically necessary for this patient, due to effect on the patient's quality of life, causing physical discomfort, social embarrassment, and disruption of occupational and daily activities.     Tolerated well last time, will repeat.    PHYSICAL EXAM (FOCUSED):    Vitals:    04/17/19 1522   BP: 127/76   Pulse: 84   Weight: 49.9 kg (110 lb 0.2 oz)   Height: 5' 5" (1.651 m)       ASSESSMENT:  Sialorrhea and     PLAN/PROCEDURE:  The goal of the injections will be to reduce drooling.  The procedure was explained to patient and a consent form was signed. Using a 30 gauge injection needle and aseptic technique the following glands were identified and injected with Botox.     BOTOX INJECTION PROCEDURE:   Dilution: 1ML 0.9% NORMAL SALINE  UNITS BOTOX    B parotids   25 u each  B submandibular 25 u each  B semispinalis  off today  L EDL (striatal toe) 50   L FDS (arm)  100  L FDP (arm)  100    Total injected  300  Total wasted  0    Ramy Beebe MD, MPH  Division of Movement and Memory Disorders  [facility]      "

## 2019-06-03 NOTE — TELEPHONE ENCOUNTER
----- Message from Priscilla Dasilva sent at 6/3/2019  9:07 AM CDT -----  Contact: Pt's daughter Maegan Issa is requesting a refill on pt's methylphenidate HCl (RITALIN) 5 MG tablet. [place] [facility] [address] [facility].    She can be reached at [phone].    Thank you

## 2019-06-04 RX ORDER — METHYLPHENIDATE HYDROCHLORIDE 5 MG/1
TABLET ORAL
Qty: 90 TABLET | Refills: 0 | Status: SHIPPED | OUTPATIENT
Start: 2019-06-04 | End: 2019-09-13 | Stop reason: SDUPTHER

## 2019-07-08 DIAGNOSIS — G24.8 FOCAL DYSTONIA: ICD-10-CM

## 2019-07-08 DIAGNOSIS — K11.7 SIALORRHEA: Primary | ICD-10-CM

## 2019-09-16 RX ORDER — METHYLPHENIDATE HYDROCHLORIDE 5 MG/1
TABLET ORAL
Qty: 90 TABLET | Refills: 0 | Status: SHIPPED | OUTPATIENT
Start: 2019-09-16 | End: 2019-12-23

## 2019-12-23 RX ORDER — METHYLPHENIDATE HYDROCHLORIDE 5 MG/1
TABLET ORAL
Qty: 90 TABLET | Refills: 0 | Status: SHIPPED | OUTPATIENT
Start: 2019-12-23 | End: 2021-06-15

## 2019-12-23 NOTE — TELEPHONE ENCOUNTER
----- Message from Roxanne Fofana sent at 12/23/2019 10:19 AM CST -----  Contact: pt daughter- Maegan  Rx Refill/Request     Is this a Refill or New Rx:  Refill  Rx Name and Strength:  methylphenidate HCl (RITALIN) 5 MG tablet  Preferred Pharmacy with phone number:   [facility] - [place] - [address] [facility] [address]  [place] [address]  Phone: [phone] Fax: [phone]    Communication Preference: [phone]    Additional Information: refill request

## 2020-03-24 NOTE — TELEPHONE ENCOUNTER
----- Message from Jazlyn Chavez sent at 3/24/2020  4:07 PM CDT -----  Rx Refill/Request     Is this a Refill or New Rx:  Refill    Rx Name and Strength:  methylphenidate HCl (RITALIN) 5 MG tablet    Preferred Pharmacy with phone number:   [facility] - [place] - [address] [facility] [address]  [address]  [place] [address]  Phone: [phone] Fax: [phone]    Communication Preference:Maegan (dtr) @ [phone]  Additional Information:

## 2020-03-26 NOTE — TELEPHONE ENCOUNTER
----- Message from Maria Victoria Patterson sent at 3/26/2020  9:21 AM CDT -----  Contact: Jesi (daughter) @ [phone]  Pts daughter is calling for the status of pts refill request for methylphenidate HCl (RITALIN) 5 MG tablet.  Pt has been out of medication for 3 days now. Pls call.     [facility] - [address] [phone] (Phone)  [phone] (Fax)

## 2020-03-27 RX ORDER — METHYLPHENIDATE HYDROCHLORIDE 5 MG/1
TABLET ORAL
Qty: 90 TABLET | Refills: 0 | Status: SHIPPED | OUTPATIENT
Start: 2020-03-27 | End: 2020-09-18 | Stop reason: SDUPTHER

## 2020-03-27 RX ORDER — METHYLPHENIDATE HYDROCHLORIDE 5 MG/1
TABLET ORAL
Qty: 90 TABLET | Refills: 0 | OUTPATIENT
Start: 2020-03-27

## 2020-09-18 NOTE — TELEPHONE ENCOUNTER
----- Message from Carina Grimaldo sent at 9/18/2020 10:32 AM CDT -----  Pt asking for a refill on medication methylphenidate HCl (RITALIN) 5 MG tablet        Contact info [phone]

## 2020-09-19 RX ORDER — METHYLPHENIDATE HYDROCHLORIDE 5 MG/1
5 TABLET ORAL DAILY
Qty: 90 TABLET | Refills: 0 | Status: SHIPPED | OUTPATIENT
Start: 2020-09-19 | End: 2020-12-21 | Stop reason: SDUPTHER

## 2020-12-15 DIAGNOSIS — G24.8 FOCAL DYSTONIA: Primary | ICD-10-CM

## 2020-12-15 DIAGNOSIS — G23.8 MULTIPLE SYSTEM ATROPHY: ICD-10-CM

## 2020-12-15 DIAGNOSIS — K11.7 SIALORRHEA: ICD-10-CM

## 2020-12-15 DIAGNOSIS — G90.3 MULTIPLE SYSTEM ATROPHY: ICD-10-CM

## 2020-12-15 DIAGNOSIS — G24.3 CERVICAL DYSTONIA: ICD-10-CM

## 2020-12-15 DIAGNOSIS — G24.8 LIMB DYSTONIA: ICD-10-CM

## 2020-12-24 RX ORDER — METHYLPHENIDATE HYDROCHLORIDE 5 MG/1
5 TABLET ORAL DAILY
Qty: 90 TABLET | Refills: 0 | OUTPATIENT
Start: 2020-12-24

## 2020-12-24 RX ORDER — METHYLPHENIDATE HYDROCHLORIDE 5 MG/1
5 TABLET ORAL DAILY
Qty: 90 TABLET | Refills: 0 | Status: SHIPPED | OUTPATIENT
Start: 2020-12-24 | End: 2021-03-15 | Stop reason: SDUPTHER

## 2021-03-16 RX ORDER — METHYLPHENIDATE HYDROCHLORIDE 5 MG/1
5 TABLET ORAL DAILY
Qty: 90 TABLET | Refills: 0 | Status: SHIPPED | OUTPATIENT
Start: 2021-03-16 | End: 2021-06-14 | Stop reason: SDUPTHER

## 2021-06-15 RX ORDER — METHYLPHENIDATE HYDROCHLORIDE 5 MG/1
5 TABLET ORAL DAILY
Qty: 90 TABLET | Refills: 0 | Status: SHIPPED | OUTPATIENT
Start: 2021-06-15 | End: 2021-10-04

## 2021-10-05 RX ORDER — METHYLPHENIDATE HYDROCHLORIDE 5 MG/1
5 TABLET ORAL DAILY
Qty: 90 TABLET | Refills: 0 | Status: SHIPPED | OUTPATIENT
Start: 2021-10-05

## 2022-05-05 ENCOUNTER — PATIENT MESSAGE (OUTPATIENT)
Dept: RESEARCH | Facility: HOSPITAL | Age: 72
End: 2022-05-05
Payer: MEDICARE
